# Patient Record
Sex: MALE | Race: BLACK OR AFRICAN AMERICAN | NOT HISPANIC OR LATINO | Employment: OTHER | URBAN - METROPOLITAN AREA
[De-identification: names, ages, dates, MRNs, and addresses within clinical notes are randomized per-mention and may not be internally consistent; named-entity substitution may affect disease eponyms.]

---

## 2017-09-06 DIAGNOSIS — I71.21 ASCENDING AORTIC ANEURYSM: Primary | ICD-10-CM

## 2017-11-16 ENCOUNTER — DOCUMENTATION ONLY (OUTPATIENT)
Dept: CARDIOTHORACIC SURGERY | Facility: CLINIC | Age: 57
End: 2017-11-16

## 2021-12-21 ENCOUNTER — TELEPHONE (OUTPATIENT)
Dept: ORTHOPEDICS | Facility: CLINIC | Age: 61
End: 2021-12-21
Payer: MEDICARE

## 2021-12-21 DIAGNOSIS — M51.36 DDD (DEGENERATIVE DISC DISEASE), LUMBAR: Primary | ICD-10-CM

## 2022-01-10 ENCOUNTER — HOSPITAL ENCOUNTER (OUTPATIENT)
Dept: RADIOLOGY | Facility: HOSPITAL | Age: 62
Discharge: HOME | End: 2022-01-10
Attending: PHYSICIAN ASSISTANT
Payer: MEDICARE

## 2022-01-10 ENCOUNTER — OFFICE VISIT (OUTPATIENT)
Dept: ORTHOPEDICS | Facility: CLINIC | Age: 62
End: 2022-01-10
Payer: MEDICARE

## 2022-01-10 VITALS — BODY MASS INDEX: 30.4 KG/M2 | HEIGHT: 75 IN | WEIGHT: 244.5 LBS

## 2022-01-10 DIAGNOSIS — G89.29 CHRONIC BILATERAL LOW BACK PAIN WITH BILATERAL SCIATICA: Primary | ICD-10-CM

## 2022-01-10 DIAGNOSIS — M54.41 CHRONIC BILATERAL LOW BACK PAIN WITH BILATERAL SCIATICA: Primary | ICD-10-CM

## 2022-01-10 DIAGNOSIS — M54.42 CHRONIC BILATERAL LOW BACK PAIN WITH BILATERAL SCIATICA: Primary | ICD-10-CM

## 2022-01-10 DIAGNOSIS — M51.36 DDD (DEGENERATIVE DISC DISEASE), LUMBAR: ICD-10-CM

## 2022-01-10 DIAGNOSIS — Z98.1 S/P LUMBAR FUSION: ICD-10-CM

## 2022-01-10 PROCEDURE — 99999 PR PBB SHADOW E&M-EST. PATIENT-LVL V: ICD-10-PCS | Mod: PBBFAC,,, | Performed by: PHYSICIAN ASSISTANT

## 2022-01-10 PROCEDURE — 99204 OFFICE O/P NEW MOD 45 MIN: CPT | Mod: S$PBB,,, | Performed by: PHYSICIAN ASSISTANT

## 2022-01-10 PROCEDURE — 72110 X-RAY EXAM L-2 SPINE 4/>VWS: CPT | Mod: TC

## 2022-01-10 PROCEDURE — 99215 OFFICE O/P EST HI 40 MIN: CPT | Mod: PBBFAC | Performed by: PHYSICIAN ASSISTANT

## 2022-01-10 PROCEDURE — 72110 XR LUMBAR SPINE AP AND LAT WITH FLEX/EXT: ICD-10-PCS | Mod: 26,,, | Performed by: RADIOLOGY

## 2022-01-10 PROCEDURE — 99204 PR OFFICE/OUTPT VISIT, NEW, LEVL IV, 45-59 MIN: ICD-10-PCS | Mod: S$PBB,,, | Performed by: PHYSICIAN ASSISTANT

## 2022-01-10 PROCEDURE — 99999 PR PBB SHADOW E&M-EST. PATIENT-LVL V: CPT | Mod: PBBFAC,,, | Performed by: PHYSICIAN ASSISTANT

## 2022-01-10 PROCEDURE — 72110 X-RAY EXAM L-2 SPINE 4/>VWS: CPT | Mod: 26,,, | Performed by: RADIOLOGY

## 2022-01-10 RX ORDER — SACUBITRIL AND VALSARTAN 97; 103 MG/1; MG/1
1 TABLET, FILM COATED ORAL 2 TIMES DAILY
COMMUNITY
Start: 2021-12-13

## 2022-01-10 RX ORDER — EMPAGLIFLOZIN 10 MG/1
10 TABLET, FILM COATED ORAL DAILY
COMMUNITY
Start: 2021-12-27

## 2022-01-10 RX ORDER — HYDROCODONE BITARTRATE AND ACETAMINOPHEN 10; 325 MG/1; MG/1
1 TABLET ORAL EVERY 6 HOURS PRN
COMMUNITY
Start: 2021-12-06

## 2022-01-10 RX ORDER — GABAPENTIN 100 MG/1
100 CAPSULE ORAL 3 TIMES DAILY
Qty: 90 CAPSULE | Refills: 11 | Status: SHIPPED | OUTPATIENT
Start: 2022-01-10 | End: 2023-01-10

## 2022-01-10 NOTE — PROGRESS NOTES
DATE: 1/10/2022  PATIENT: Balwinder Dc    Supervising Physician: Tc Iyer M.D.    CHIEF COMPLAINT: low back and bilateral leg pain    HISTORY:  Balwinder Dc is a 62 y.o. male with a history of L3-S1 lumbar fusion in 2007 at [facility] and revision without hardware removal in 2007, DM2, HTN, HIV, and pacemaker. He is here for initial evaluation of low back and bilateral leg pain (Back - 8, Leg - 9).  The pain in the legs is what bothers him most. He states he is only able to walk less than half of a block before the pain in his legs causes him fall if there is no seat near him. He uses a cane to ambulate most days. The pain has been present for since his fusion over 10 years ago. The patient states about 5 years ago he went to a Dr. In [place] that did imaging which reveiled a   Broken screw. There was no intervention at that time. The patient describes the pain as sharp/shooting in his legs and aching in his back.  The pain is worse with walking and improved by rest. There is associated numbness and tingling. There is subjective weakness. Prior treatments have included Lortab, tylenol, ESIs, and advil but no recent ESIs or surgery.  Of note, patient had prior fusion in 2007 with subsequent revision decompression at L3/4, abandoned hadrware removal, and was left with a fractured screw at right S1. According to notes obtained from his provider in [place], patient was told in May 2020 that he needs a heart valve replacement. It is unclear whether this was done or not.   Patient reportedly had a hardware block 2/8/2020 with good relief.     The patient denies myelopathic symptoms such as handwriting changes or difficulty with buttons/coins/keys. Denies perineal paresthesias, bowel/bladder dysfunction.     PAST MEDICAL/SURGICAL HISTORY:  Past Medical History:   Diagnosis Date    Allergy     Anticoagulant long-term use     Anxiety     Arthritis     Blood transfusion     CHF (congestive  heart failure)     EF 25%    Coronary artery disease     Depression     Hypertension      Past Surgical History:   Procedure Laterality Date    BACK SURGERY      back surgery, knee replacement      CARDIAC SURGERY      HERNIA REPAIR      INSERT / REPLACE / REMOVE PACEMAKER  8/20/13    DC ICD; [facility] [place]    JOINT REPLACEMENT      TONSILLECTOMY         Medications:   Current Outpatient Medications on File Prior to Visit   Medication Sig Dispense Refill    albuterol 90 mcg/actuation HFAA Inhale 2 puffs into the lungs every 6 (six) hours as needed.      alendronate (FOSAMAX) 70 MG tablet Take 70 mg by mouth every 7 days.      alprazolam (XANAX) 1 MG tablet TAKE ONE TABLET BY MOUTH AS NEEDED FOR ANXIETY  2    amiodarone (PACERONE) 200 MG Tab Take 200 mg by mouth 3 (three) times daily.  3    amitriptyline (ELAVIL) 10 MG tablet Take 5 mg by mouth nightly as needed for Insomnia.      atorvastatin (LIPITOR) 20 MG tablet Take 20 mg by mouth once daily.  2    atorvastatin (LIPITOR) 40 MG tablet Take 40 mg by mouth once daily.      budesonide-formoterol 160-4.5 mcg (SYMBICORT) 160-4.5 mcg/actuation HFAA Inhale 2 puffs into the lungs every 12 (twelve) hours.      budesonide-formoterol 160-4.5 mcg (SYMBICORT) 160-4.5 mcg/actuation HFAA INHALE 2 PUFFS INTO THE LUNGS 2 TIMES DAILY.      cetirizine 10 mg chewable tablet Take 10 mg by mouth once daily.      coenzyme Q10 200 mg capsule Take 200 mg by mouth once daily.      emtricitabine-tenofovir 200-300 mg (TRUVADA) 200-300 mg Tab Take 1 tablet by mouth once daily.      enalapril (VASOTEC) 5 MG tablet Take 5 mg by mouth 2 (two) times daily.  6    ENTRESTO  mg per tablet Take 1 tablet by mouth 2 (two) times daily.      esomeprazole (NEXIUM) 40 MG capsule Take 40 mg by mouth before breakfast.      furosemide (LASIX) 40 MG tablet Take 40 mg by mouth once daily.      hydrALAZINE (APRESOLINE) 50 MG tablet Take 50 mg by mouth 2 (two) times daily.  11     HYDROcodone-acetaminophen (NORCO)  mg per tablet Take 1 tablet by mouth every 6 (six) hours as needed.      iron steofj-Y-R20-Ca-suc-stoma 70 mg-150 mg-10 mcg-2 mg-75 mg Tab tablet Take by mouth once daily.      isosorbide dinitrate (ISORDIL) 10 MG tablet Take 10 mg by mouth 2 (two) times daily.  11    JARDIANCE 10 mg tablet Take 10 mg by mouth once daily.      magnesium oxide 400 mg Cap Take by mouth once daily.      meclizine (ANTIVERT) 25 mg tablet Take 25 mg by mouth 3 (three) times daily as needed.      metoprolol succinate (TOPROL-XL) 50 MG 24 hr tablet Take 50 mg by mouth 2 (two) times daily.  6    nitroGLYCERIN (NITROSTAT) 0.4 MG SL tablet Place 0.4 mg under the tongue every 5 (five) minutes as needed.      potassium bicarb-citric acid 20 mEq TbEF Take by mouth once daily.      prochlorperazine (COMPAZINE) 10 MG tablet Take 10 mg by mouth 3 (three) times daily.      raltegravir (ISENTRESS) 400 mg tablet Take 400 mg by mouth 2 (two) times daily.      spironolactone (ALDACTONE) 25 MG tablet Take 25 mg by mouth once daily.  6    warfarin (COUMADIN) 5 MG tablet Take 5 mg by mouth every Mon, Wed, Fri.      warfarin (COUMADIN) 5 MG tablet AS DIRECTED (VARIES FROM 5MG TO 7.5MG TO 10MG DAILY)       No current facility-administered medications on file prior to visit.       Social History:   Social History     Socioeconomic History    Marital status: Single   Tobacco Use    Smoking status: Never Smoker   Substance and Sexual Activity    Alcohol use: No    Drug use: No    Sexual activity: Not Currently       REVIEW OF SYSTEMS:  Constitution: Negative. Negative for chills, fever and night sweats.   Cardiovascular: Negative for chest pain and syncope.   Respiratory: Negative for cough and shortness of breath.   Gastrointestinal: See HPI. Negative for nausea/vomiting. Negative for abdominal pain.  Genitourinary: See HPI. Negative for discoloration or dysuria.  Skin: Negative for dry skin, itching  "and rash.   Hematologic/Lymphatic: Negative for bleeding problem. Does not bruise/bleed easily.   Musculoskeletal: Negative for falls and muscle weakness.   Neurological: See HPI. No seizures.   Endocrine: Negative for polydipsia, polyphagia and polyuria.   Allergic/Immunologic: Negative for hives and persistent infections.     EXAM:  Ht 6' 3" (1.905 m)   Wt 110.9 kg (244 lb 7.8 oz)   BMI 30.56 kg/m²     General: The patient is a very pleasant 62 y.o. male in no apparent distress, the patient is oriented to person, place and time.  Psych: Normal mood and affect  HEENT: Vision grossly intact, hearing intact to the spoken word.  Lungs: Respirations unlabored.  Gait: antalgic station and gait, some difficulty with toe or heel walk.   Skin: Dorsal lumbar skin negative for rashes, lesions, hairy patches. surgical scars to bilateral knees and lower back. There is mild lumbar tenderness to palpation.  Range of motion: Lumbar range of motion is decreased due to pain.  Spinal Balance: Global saggital and coronal spinal balance acceptable, not significant for scoliosis and kyphosis.  Musculoskeletal: No pain with the range of motion of the bilateral hips. No trochanteric tenderness to palpation.  Vascular: Bilateral lower extremities warm and well perfused, dorsalis pedis pulses 2+ bilaterally.  Neurological: Normal strength and tone in all major motor groups in the bilateral lower extremities. Normal sensation to light touch in the L2-S1 dermatomes bilaterally.  Deep tendon reflexes symmetric 2+ in the bilateral lower extremities.  Negative Babinski bilaterally. Straight leg raise negative bilaterally.    IMAGING:      Today I personally reviewed AP, Lat and Flex/Ex  upright L-spine films that demonstrate posterior fusion L3 through S1 with fractured screw on the left at S1.     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 There is no evidence of acute lumbosacral " radiculopathy affecting the lower limbs. Some mild chronic denervation in left L4/5 supplied muscles suggests remote injury involving these root levels.      Body mass index is 30.56 kg/m².    No results found for: HGBA1C        ASSESSMENT/PLAN:    Balwinder was seen today for low-back pain and leg pain.    Diagnoses and all orders for this visit:    Chronic bilateral low back pain with bilateral sciatica  -     gabapentin (NEURONTIN) 100 MG capsule; Take 1 capsule (100 mg total) by mouth 3 (three) times daily.  -     X-Ray Chest 1 View; Future  -     MRI Lumbar Spine W WO Contrast; Future  -     Cardiac device check - In Clinic & Hospital; Future  -     Creatinine, serum; Future    S/P lumbar fusion  -     gabapentin (NEURONTIN) 100 MG capsule; Take 1 capsule (100 mg total) by mouth 3 (three) times daily.  -     X-Ray Chest 1 View; Future  -     MRI Lumbar Spine W WO Contrast; Future  -     Cardiac device check - In Clinic & Hospital; Future  -     Creatinine, serum; Future  -     CT Lumbar Spine Without Contrast; Future      Today we discussed at length all of the different treatment options including anti-inflammatories, acetaminophen, rest, ice, heat, physical therapy including strengthening and stretching exercises, home exercises, ROM, aerobic conditioning, aqua therapy, other modalities including ultrasound, massage, and dry needling, epidural steroid injections and finally surgical intervention.      Gabapentin sent to pharmacy. MRI with cardiac pacer check and CT scan placed. The patient will follow up in the clinic for results.